# Patient Record
Sex: MALE | Race: WHITE | NOT HISPANIC OR LATINO | URBAN - METROPOLITAN AREA
[De-identification: names, ages, dates, MRNs, and addresses within clinical notes are randomized per-mention and may not be internally consistent; named-entity substitution may affect disease eponyms.]

---

## 2021-08-14 ENCOUNTER — EMERGENCY (EMERGENCY)
Facility: HOSPITAL | Age: 36
LOS: 0 days | Discharge: HOME | End: 2021-08-14
Attending: STUDENT IN AN ORGANIZED HEALTH CARE EDUCATION/TRAINING PROGRAM | Admitting: STUDENT IN AN ORGANIZED HEALTH CARE EDUCATION/TRAINING PROGRAM
Payer: MEDICAID

## 2021-08-14 VITALS
RESPIRATION RATE: 18 BRPM | TEMPERATURE: 98 F | DIASTOLIC BLOOD PRESSURE: 80 MMHG | OXYGEN SATURATION: 99 % | WEIGHT: 195.11 LBS | SYSTOLIC BLOOD PRESSURE: 134 MMHG | HEART RATE: 86 BPM

## 2021-08-14 DIAGNOSIS — Z88.1 ALLERGY STATUS TO OTHER ANTIBIOTIC AGENTS STATUS: ICD-10-CM

## 2021-08-14 DIAGNOSIS — S02.601A FRACTURE OF UNSPECIFIED PART OF BODY OF RIGHT MANDIBLE, INITIAL ENCOUNTER FOR CLOSED FRACTURE: ICD-10-CM

## 2021-08-14 DIAGNOSIS — Z86.59 PERSONAL HISTORY OF OTHER MENTAL AND BEHAVIORAL DISORDERS: ICD-10-CM

## 2021-08-14 DIAGNOSIS — R51.9 HEADACHE, UNSPECIFIED: ICD-10-CM
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
## 2021-08-14 DIAGNOSIS — F17.210 NICOTINE DEPENDENCE, CIGARETTES, UNCOMPLICATED: ICD-10-CM

## 2021-08-14 DIAGNOSIS — R22.0 LOCALIZED SWELLING, MASS AND LUMP, HEAD: ICD-10-CM

## 2021-08-14 PROCEDURE — 99284 EMERGENCY DEPT VISIT MOD MDM: CPT

## 2021-08-14 PROCEDURE — 73620 X-RAY EXAM OF FOOT: CPT | Mod: 26,RT

## 2021-08-14 PROCEDURE — 70486 CT MAXILLOFACIAL W/O DYE: CPT | Mod: 26,MA

## 2021-08-14 RX ORDER — AMOXICILLIN 250 MG/5ML
10 SUSPENSION, RECONSTITUTED, ORAL (ML) ORAL
Qty: 210 | Refills: 0
Start: 2021-08-14 | End: 2021-08-20

## 2021-08-14 RX ORDER — IBUPROFEN 200 MG
600 TABLET ORAL ONCE
Refills: 0 | Status: COMPLETED | OUTPATIENT
Start: 2021-08-14 | End: 2021-08-14

## 2021-08-14 RX ADMIN — Medication 600 MILLIGRAM(S): at 13:24

## 2021-08-14 NOTE — ED PROVIDER NOTE - CARE PROVIDER_API CALL
Alvaro Villanueva; DDS)  Dentistry  [address] [place] [address]  Phone: [phone]  Fax: [phone]  Scheduled Appointment: 08/16/2021 09:00 AM

## 2021-08-14 NOTE — ED PROVIDER NOTE - PHYSICAL EXAMINATION
CONSTITUTIONAL: Well-developed; well-nourished; in no acute distress. PT sitting comfortably in chair  SKIN: warm, dry  HEAD: Normocephalic; atraumatic.  EYES: no conjunctival injection. PERRLA. EOMI.   ENT: No nasal discharge; airway clear, small abrasion in the mouth adjacent to RT molars, no external lacerations or abrasions, symmetric jaw opening/closing, +tenderness/swelling over RT mid-mandible, no TMJ tenderness, no other facial tenderness  NECK: Supple; non tender.  CARD: S1, S2 normal; no murmurs, gallops, or rubs. Regular rate and rhythm.   RESP: No wheezes, rales or rhonchi.  ABD: soft ntnd. BS+  EXT: Normal ROM.  No clubbing, cyanosis. Mild RT midfoot swelling, no medial or lateral malleolus tenderness, no tenderness over the navicula or at the base of 5th metatarsal, PT able to ambulate normally  LYMPH: No acute cervical adenopathy.  NEURO: Alert, oriented, grossly unremarkable.  PSYCH: Cooperative, appropriate.

## 2021-08-14 NOTE — ED ADULT NURSE NOTE - DOES PATIENT HAVE ADVANCE DIRECTIVE
Fax received from [facility]~  Theresa is c/o increased pain in both upper arms.  She can barely lift her arms without crying.  She understands that weather has a lot to do with her pain, however, she is wondering if there is anything else that can be done to alleviate her pain?  She is currently taking Acetaminophen 500 mg one tab po tid at 0800, 1400 and 2000 and Tramadol 50 mg one tab po qid at 0800,1200, 1600 and 2000.   Check labs: CRP and ESR and fax results. Then set up office visit after completed per Dr. Ferreira signed fax.     No

## 2021-08-14 NOTE — ED PROVIDER NOTE - NS ED ROS FT
CONSTITUTIONAL - No acute distress , No weight change  SKIN - No rash  HEMATOLOGIC - No abnormal bleeding or bruising  EYES - , No conjunctival injection, No drainage   ENT -, No sore throat, No neck pain, No rhinorrhea, No ear pain, RT jaw pain/swelling  RESPIRATORY - No shortness of breath, No cough  CARDIAC -No chest pain, No palpitations  GI - No abdominal pain, No nausea, No vomiting, No diarrhea, No constipation, No bright red blood per rectum or melena. No flank pain  - No dysuria, frequency, hematuria.   ENDO - No polydipsia, No polyuria, No heat/cold intolerance  MUSCULOSKELETAL - No joint paint, No swelling, No back pain  NEUROLOGIC - No numbness, No focal weakness, No headache, No dizziness  ALLERGIC- no pruritis

## 2021-08-14 NOTE — ED PROVIDER NOTE - NSFOLLOWUPINSTRUCTIONS_ED_ALL_ED_FT
Mandibular Fracture    A mandibular fracture is a break in the jawbone.    Follow these instructions at home:  If told, apply ice to the injured area:  Put ice in a plastic bag.  Place a towel between your skin and the bag.  Leave the ice on for 20 minutes, 2–3 times per day.  Take over-the-counter and prescription medicines only as told by your doctor.  Follow your doctor's instructions about diet. You may need to eat only soft foods or liquid foods. Make sure to get enough protein and other nutrients in your diet.  Sleep on your back. This makes it so that you do not put pressure on your jaw.  Try not to exercise so hard that you get short of breath.  Contact a doctor if:  You have a bad headache.  You cannot feel parts of your face.  You have very bad pain in your jaw that does not get better when you take medicine.  You feel like you are going to throw up (nauseous) and nothing helps.  You feel anxious and nothing helps.  Your swelling or redness gets worse.  Get help right away if:  You have a fever.  You have trouble breathing.  You feel like your airway is tight.  You cannot swallow your spit (saliva).  You make a high-pitched whistling sound when you breathe (wheeze).  This information is not intended to replace advice given to you by your health care provider. Make sure you discuss any questions you have with your health care provider.
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
## 2021-08-14 NOTE — ED PROVIDER NOTE - PROGRESS NOTE DETAILS
Positive tongue depressor test, will obtain CT max/face to assess for mandibular fracture, also RT foot x-ray to assess for fracture -CD Positive tongue depressor test, will obtain CT max/face to assess for mandibular fracture, also RT foot x-ray to assess for fracture, give ibuprofen for pain -CD Radiology called to confirm mandibular fracture. PT informed, dental contacted (Danielle), dental will come to evaluate the PT. PT remains stable and comfortable -CD RT foot x-ray negative, PT informed - CD OMFS stabilized fracture with wire, antibiotix sent to pharmacy, PT to follow up with OMFS on Monday for additional management -CD

## 2021-08-14 NOTE — ED PROVIDER NOTE - PATIENT PORTAL LINK FT
You can access the FollowMyHealth Patient Portal offered by [facility] by registering at the following website: [email or web address]. By joining [employer]’s FollowMyHealth portal, you will also be able to view your health information using other applications (apps) compatible with our system.
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
## 2021-08-17 PROBLEM — F32.9 MAJOR DEPRESSIVE DISORDER, SINGLE EPISODE, UNSPECIFIED: Chronic | Status: ACTIVE | Noted: 2021-08-14

## 2021-08-17 PROBLEM — F41.9 ANXIETY DISORDER, UNSPECIFIED: Chronic | Status: ACTIVE | Noted: 2021-08-14

## 2021-08-18 ENCOUNTER — OUTPATIENT (OUTPATIENT)
Dept: OUTPATIENT SERVICES | Facility: HOSPITAL | Age: 36
LOS: 1 days | Discharge: HOME | End: 2021-08-18
Payer: MEDICAID

## 2021-08-18 VITALS
RESPIRATION RATE: 18 BRPM | DIASTOLIC BLOOD PRESSURE: 70 MMHG | OXYGEN SATURATION: 99 % | SYSTOLIC BLOOD PRESSURE: 117 MMHG | HEIGHT: 74 IN | WEIGHT: 189.6 LBS | HEART RATE: 61 BPM | TEMPERATURE: 98 F

## 2021-08-18 DIAGNOSIS — Z98.890 OTHER SPECIFIED POSTPROCEDURAL STATES: Chronic | ICD-10-CM

## 2021-08-18 DIAGNOSIS — S02.609B FRACTURE OF MANDIBLE, UNSPECIFIED, INITIAL ENCOUNTER FOR OPEN FRACTURE: ICD-10-CM

## 2021-08-18 DIAGNOSIS — Z01.818 ENCOUNTER FOR OTHER PREPROCEDURAL EXAMINATION: ICD-10-CM

## 2021-08-18 LAB
ALBUMIN SERPL ELPH-MCNC: 4.4 G/DL — SIGNIFICANT CHANGE UP (ref 3.5–5.2)
ALP SERPL-CCNC: 67 U/L — SIGNIFICANT CHANGE UP (ref 30–115)
ALT FLD-CCNC: 13 U/L — SIGNIFICANT CHANGE UP (ref 0–41)
ANION GAP SERPL CALC-SCNC: 8 MMOL/L — SIGNIFICANT CHANGE UP (ref 7–14)
APPEARANCE UR: CLEAR — SIGNIFICANT CHANGE UP
APTT BLD: 35.4 SEC — SIGNIFICANT CHANGE UP (ref 27–39.2)
AST SERPL-CCNC: 19 U/L — SIGNIFICANT CHANGE UP (ref 0–41)
BASOPHILS # BLD AUTO: 0.05 K/UL — SIGNIFICANT CHANGE UP (ref 0–0.2)
BASOPHILS NFR BLD AUTO: 0.6 % — SIGNIFICANT CHANGE UP (ref 0–1)
BILIRUB SERPL-MCNC: <0.2 MG/DL — SIGNIFICANT CHANGE UP (ref 0.2–1.2)
BILIRUB UR-MCNC: NEGATIVE — SIGNIFICANT CHANGE UP
BLD GP AB SCN SERPL QL: SIGNIFICANT CHANGE UP
BUN SERPL-MCNC: 25 MG/DL — HIGH (ref 10–20)
CALCIUM SERPL-MCNC: 9.4 MG/DL — SIGNIFICANT CHANGE UP (ref 8.5–10.1)
CHLORIDE SERPL-SCNC: 101 MMOL/L — SIGNIFICANT CHANGE UP (ref 98–110)
CO2 SERPL-SCNC: 30 MMOL/L — SIGNIFICANT CHANGE UP (ref 17–32)
COLOR SPEC: YELLOW — SIGNIFICANT CHANGE UP
CREAT SERPL-MCNC: 0.9 MG/DL — SIGNIFICANT CHANGE UP (ref 0.7–1.5)
DIFF PNL FLD: NEGATIVE — SIGNIFICANT CHANGE UP
EOSINOPHIL # BLD AUTO: 0.12 K/UL — SIGNIFICANT CHANGE UP (ref 0–0.7)
EOSINOPHIL NFR BLD AUTO: 1.5 % — SIGNIFICANT CHANGE UP (ref 0–8)
GLUCOSE SERPL-MCNC: 79 MG/DL — SIGNIFICANT CHANGE UP (ref 70–99)
GLUCOSE UR QL: NEGATIVE — SIGNIFICANT CHANGE UP
HCT VFR BLD CALC: 31.7 % — LOW (ref 42–52)
HGB BLD-MCNC: 10.3 G/DL — LOW (ref 14–18)
IMM GRANULOCYTES NFR BLD AUTO: 0.3 % — SIGNIFICANT CHANGE UP (ref 0.1–0.3)
INR BLD: 0.99 RATIO — SIGNIFICANT CHANGE UP (ref 0.65–1.3)
KETONES UR-MCNC: NEGATIVE — SIGNIFICANT CHANGE UP
LEUKOCYTE ESTERASE UR-ACNC: NEGATIVE — SIGNIFICANT CHANGE UP
LYMPHOCYTES # BLD AUTO: 2.18 K/UL — SIGNIFICANT CHANGE UP (ref 1.2–3.4)
LYMPHOCYTES # BLD AUTO: 28.1 % — SIGNIFICANT CHANGE UP (ref 20.5–51.1)
MCHC RBC-ENTMCNC: 25.8 PG — LOW (ref 27–31)
MCHC RBC-ENTMCNC: 32.5 G/DL — SIGNIFICANT CHANGE UP (ref 32–37)
MCV RBC AUTO: 79.3 FL — LOW (ref 80–94)
MONOCYTES # BLD AUTO: 0.49 K/UL — SIGNIFICANT CHANGE UP (ref 0.1–0.6)
MONOCYTES NFR BLD AUTO: 6.3 % — SIGNIFICANT CHANGE UP (ref 1.7–9.3)
NEUTROPHILS # BLD AUTO: 4.89 K/UL — SIGNIFICANT CHANGE UP (ref 1.4–6.5)
NEUTROPHILS NFR BLD AUTO: 63.2 % — SIGNIFICANT CHANGE UP (ref 42.2–75.2)
NITRITE UR-MCNC: NEGATIVE — SIGNIFICANT CHANGE UP
NRBC # BLD: 0 /100 WBCS — SIGNIFICANT CHANGE UP (ref 0–0)
PH UR: 6 — SIGNIFICANT CHANGE UP (ref 5–8)
PLATELET # BLD AUTO: 306 K/UL — SIGNIFICANT CHANGE UP (ref 130–400)
POTASSIUM SERPL-MCNC: 4.4 MMOL/L — SIGNIFICANT CHANGE UP (ref 3.5–5)
POTASSIUM SERPL-SCNC: 4.4 MMOL/L — SIGNIFICANT CHANGE UP (ref 3.5–5)
PROT SERPL-MCNC: 6.9 G/DL — SIGNIFICANT CHANGE UP (ref 6–8)
PROT UR-MCNC: NEGATIVE — SIGNIFICANT CHANGE UP
PROTHROM AB SERPL-ACNC: 11.4 SEC — SIGNIFICANT CHANGE UP (ref 9.95–12.87)
RBC # BLD: 4 M/UL — LOW (ref 4.7–6.1)
RBC # FLD: 13.4 % — SIGNIFICANT CHANGE UP (ref 11.5–14.5)
SODIUM SERPL-SCNC: 139 MMOL/L — SIGNIFICANT CHANGE UP (ref 135–146)
SP GR SPEC: 1.03 — HIGH (ref 1.01–1.03)
UROBILINOGEN FLD QL: SIGNIFICANT CHANGE UP
WBC # BLD: 7.75 K/UL — SIGNIFICANT CHANGE UP (ref 4.8–10.8)
WBC # FLD AUTO: 7.75 K/UL — SIGNIFICANT CHANGE UP (ref 4.8–10.8)

## 2021-08-18 PROCEDURE — 71046 X-RAY EXAM CHEST 2 VIEWS: CPT | Mod: 26

## 2021-08-18 PROCEDURE — 93010 ELECTROCARDIOGRAM REPORT: CPT

## 2021-08-18 RX ORDER — SERTRALINE 25 MG/1
200 TABLET, FILM COATED ORAL
Qty: 0 | Refills: 0 | DISCHARGE

## 2021-08-18 RX ORDER — GABAPENTIN 400 MG/1
1 CAPSULE ORAL
Qty: 0 | Refills: 0 | DISCHARGE

## 2021-08-18 RX ORDER — METHADONE HYDROCHLORIDE 40 MG/1
135 TABLET ORAL
Qty: 0 | Refills: 0 | DISCHARGE

## 2021-08-18 NOTE — H&P PST ADULT - REASON FOR ADMISSION
Case Type: OP Block Time Suite: [facility] Proceduralist: Alvaro Villanueva  Confirmed Surgery Date Time: 08-     Procedure: ORIF of the mandible  Laterality: N/A   Length of Procedure: 180 Minutes    Anesthesia Type: General  Covid testing 08/28/2021 1230

## 2021-08-18 NOTE — H&P PST ADULT - NSANTHOSAYNRD_GEN_A_CORE
No. VIN screening performed.  STOP BANG Legend: 0-2 = LOW Risk; 3-4 = INTERMEDIATE Risk; 5-8 = HIGH Risk
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
## 2021-08-18 NOTE — H&P PST ADULT - NSSTREETDRUGTY_GEN_ALL_CORE_SD
Quit 2 yrs ago/amphetamines/cocaine/ecstasy/heroin/marijuana/methamphetamine/narcotics/phencyclidine/sedatives/stimulants

## 2021-08-18 NOTE — H&P PST ADULT - MALLAMPATI CLASS
unable to open mouth unable to open mouth/Class IV (difficult) - the soft palate is not visible at all

## 2021-08-19 LAB
CULTURE RESULTS: NO GROWTH — SIGNIFICANT CHANGE UP
SPECIMEN SOURCE: SIGNIFICANT CHANGE UP

## 2021-08-23 DIAGNOSIS — S02.609D FRACTURE OF MANDIBLE, UNSPECIFIED, SUBSEQUENT ENCOUNTER FOR FRACTURE WITH ROUTINE HEALING: ICD-10-CM

## 2021-08-28 ENCOUNTER — OUTPATIENT (OUTPATIENT)
Dept: OUTPATIENT SERVICES | Facility: HOSPITAL | Age: 36
LOS: 1 days | Discharge: HOME | End: 2021-08-28

## 2021-08-28 DIAGNOSIS — Z11.59 ENCOUNTER FOR SCREENING FOR OTHER VIRAL DISEASES: ICD-10-CM

## 2021-08-28 DIAGNOSIS — Z98.890 OTHER SPECIFIED POSTPROCEDURAL STATES: Chronic | ICD-10-CM

## 2021-08-28 PROBLEM — F19.20 OTHER PSYCHOACTIVE SUBSTANCE DEPENDENCE, UNCOMPLICATED: Chronic | Status: ACTIVE | Noted: 2021-08-18

## 2021-08-31 ENCOUNTER — RESULT REVIEW (OUTPATIENT)
Age: 36
End: 2021-08-31

## 2021-08-31 ENCOUNTER — OUTPATIENT (OUTPATIENT)
Dept: OUTPATIENT SERVICES | Facility: HOSPITAL | Age: 36
LOS: 1 days | Discharge: HOME | End: 2021-08-31
Payer: MEDICAID

## 2021-08-31 VITALS — HEIGHT: 74 IN | TEMPERATURE: 98 F | WEIGHT: 190.04 LBS | OXYGEN SATURATION: 98 %

## 2021-08-31 VITALS
OXYGEN SATURATION: 98 % | HEART RATE: 71 BPM | DIASTOLIC BLOOD PRESSURE: 80 MMHG | TEMPERATURE: 98 F | RESPIRATION RATE: 16 BRPM | SYSTOLIC BLOOD PRESSURE: 132 MMHG

## 2021-08-31 DIAGNOSIS — Z98.890 OTHER SPECIFIED POSTPROCEDURAL STATES: Chronic | ICD-10-CM

## 2021-08-31 PROCEDURE — 88300 SURGICAL PATH GROSS: CPT | Mod: 26

## 2021-08-31 RX ORDER — HYDROMORPHONE HYDROCHLORIDE 2 MG/ML
0.5 INJECTION INTRAMUSCULAR; INTRAVENOUS; SUBCUTANEOUS
Refills: 0 | Status: DISCONTINUED | OUTPATIENT
Start: 2021-08-31 | End: 2021-08-31

## 2021-08-31 RX ORDER — HYDROMORPHONE HYDROCHLORIDE 2 MG/ML
1 INJECTION INTRAMUSCULAR; INTRAVENOUS; SUBCUTANEOUS
Refills: 0 | Status: DISCONTINUED | OUTPATIENT
Start: 2021-08-31 | End: 2021-08-31

## 2021-08-31 RX ADMIN — HYDROMORPHONE HYDROCHLORIDE 0.5 MILLIGRAM(S): 2 INJECTION INTRAMUSCULAR; INTRAVENOUS; SUBCUTANEOUS at 15:45

## 2021-08-31 RX ADMIN — HYDROMORPHONE HYDROCHLORIDE 0.5 MILLIGRAM(S): 2 INJECTION INTRAMUSCULAR; INTRAVENOUS; SUBCUTANEOUS at 16:10

## 2021-08-31 RX ADMIN — HYDROMORPHONE HYDROCHLORIDE 0.5 MILLIGRAM(S): 2 INJECTION INTRAMUSCULAR; INTRAVENOUS; SUBCUTANEOUS at 15:55

## 2021-08-31 RX ADMIN — HYDROMORPHONE HYDROCHLORIDE 0.5 MILLIGRAM(S): 2 INJECTION INTRAMUSCULAR; INTRAVENOUS; SUBCUTANEOUS at 15:30

## 2021-08-31 NOTE — ASU DISCHARGE PLAN (ADULT/PEDIATRIC) - CALL YOUR DOCTOR IF YOU HAVE ANY OF THE FOLLOWING:
Bleeding that does not stop/Swelling that gets worse/Pain not relieved by Medications/Wound/Surgical Site with redness, or foul smelling discharge or pus/Nausea and vomiting that does not stop

## 2021-08-31 NOTE — BRIEF OPERATIVE NOTE - NSICDXBRIEFPROCEDURE_GEN_ALL_CORE_FT
PROCEDURES:  Open reduction and internal fixation (ORIF) of fracture of mandible by maxillomandibular fixation using arch bars and wires 31-Aug-2021 15:20:48  Giorgi Allen  Tooth extraction 31-Aug-2021 15:21:28  Giorgi Allen

## 2021-08-31 NOTE — ASU DISCHARGE PLAN (ADULT/PEDIATRIC) - ASU DC SPECIAL INSTRUCTIONSFT
We uncovered your fractures and placed plates to secure them together. You will have a bar across your lower jaw and screws in the upper jaw in the event we need to wire you if your bite shifts. We have chosen to not wire you closed, but you must follow a strict full liquid diet. Any chewing will cause a shift in the bone and potential failure of the plates. Keep the superficial dressing in place for 24 hours ( you can remove at 3:30 PM tomorrow. The tape along the chin/face must remain in place for 1 week.     Medications:   - Amoxicillin suspension   - Peridex mouth was (begin tomorrow)   - Ibuprofen suspension for pain

## 2021-08-31 NOTE — BRIEF OPERATIVE NOTE - OPERATION/FINDINGS
unfavorable right angle fracture and oblique symphysis fracture. Patient with pre-op class 2 Div 2 malocclusion. Placed patient back into pre-op malocclusion

## 2021-08-31 NOTE — BRIEF OPERATIVE NOTE - NSICDXBRIEFPREOP_GEN_ALL_CORE_FT
Patient is returning call, please see message below.     Callback Number: [phone] (M)  Best Availability: Any time.  Can A Detailed Message Be Left? yes  Did you confirm the message with the caller?: yes    Thank you,  Dwight Kaur       PRE-OP DIAGNOSIS:  Bilateral mandibular fracture 31-Aug-2021 15:21:04  Giorgi Allen

## 2021-08-31 NOTE — CHART NOTE - NSCHARTNOTEFT_GEN_A_CORE
PACU ANESTHESIA ADMISSION NOTE      Procedure: B/L Mandible ORIF  Post op diagnosis:  Mandible Fx    ____  Intubated  TV:______       Rate: ______      FiO2: ______    _x___  Patent Airway    __x__  Full return of protective reflexes    _x___  Full recovery from anesthesia / back to baseline     Vitals:   T:  98         R:  18                BP:  157/89                Sat:   100                P: 89      Mental Status:  __x__ Awake   _____ Alert   _____ Drowsy   _____ Sedated    Nausea/Vomiting:  _x___ NO  ______Yes,   See Post - Op Orders          Pain Scale (0-10):  _____    Treatment: ____ None    __x_ See Post - Op/PCA Orders    Post - Operative Fluids:   ____ Oral   ___x_ See Post - Op Orders    Plan: Discharge:   __x__Home       _____Floor     _____Critical Care    _____  Other:_________________    Comments:

## 2021-08-31 NOTE — ASU DISCHARGE PLAN (ADULT/PEDIATRIC) - CARE PROVIDER_API CALL
Alvaro Villanueva; DDS)  Dentistry  [address] [place] [address]  Phone: [phone]  Fax: [phone]  Established Patient  Scheduled Appointment: 09/10/2021 12:30 PM

## 2021-08-31 NOTE — ASU DISCHARGE PLAN (ADULT/PEDIATRIC) - PROVIDER TOKENS
PROVIDER:[TOKEN:[[id number]],SCHEDULEDAPPT:[09/10/2021],SCHEDULEDAPPTTIME:[12:30 PM],ESTABLISHEDPATIENT:[T]]

## 2021-08-31 NOTE — ASU DISCHARGE PLAN (ADULT/PEDIATRIC) - BATHING
Do not submerge your face, you can wash the rest of your body in the shower and sponge your face/Sponge only

## 2021-09-02 LAB — SURGICAL PATHOLOGY STUDY: SIGNIFICANT CHANGE UP

## 2021-09-06 DIAGNOSIS — S02.66XA FRACTURE OF SYMPHYSIS OF MANDIBLE, INITIAL ENCOUNTER FOR CLOSED FRACTURE: ICD-10-CM

## 2021-09-06 DIAGNOSIS — Z88.1 ALLERGY STATUS TO OTHER ANTIBIOTIC AGENTS STATUS: ICD-10-CM
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
## 2021-09-06 DIAGNOSIS — S02.651A FRACTURE OF ANGLE OF RIGHT MANDIBLE, INITIAL ENCOUNTER FOR CLOSED FRACTURE: ICD-10-CM

## 2022-09-19 ENCOUNTER — APPOINTMENT (OUTPATIENT)
Dept: PLASTIC SURGERY | Facility: CLINIC | Age: 37
End: 2022-09-19

## 2022-09-19 VITALS — HEIGHT: 75 IN | WEIGHT: 215 LBS | BODY MASS INDEX: 26.73 KG/M2

## 2022-09-19 DIAGNOSIS — Z87.891 PERSONAL HISTORY OF NICOTINE DEPENDENCE: ICD-10-CM

## 2022-09-19 PROBLEM — Z00.00 ENCOUNTER FOR PREVENTIVE HEALTH EXAMINATION: Status: ACTIVE | Noted: 2022-09-19

## 2022-09-19 PROCEDURE — 99203 OFFICE O/P NEW LOW 30 MIN: CPT

## 2022-09-19 RX ORDER — METHADONE HYDROCHLORIDE 5 MG/1
TABLET ORAL
Refills: 0 | Status: ACTIVE | COMMUNITY

## 2022-09-21 NOTE — HISTORY OF PRESENT ILLNESS
[FreeTextEntry1] : 37 yo M with PMHx of substance use/IVDU, otherwise healthy who presents by referral from dermatologist Dr. Ahmadi for multiple superficial cysts on the face and scalp. 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 He has noticed these over the past several years, not changing significantly, no h/o infection or discharge. No personal or FHx of skin cancer. \par \par PMH: substance use, in treatment and on methadone\par PSH: ORIF for fractured mandible and L wrist\par Meds: Zoloft, gabaptentin, methadone\par Allergies: NKDA, nausea with clindamycin\par SH: lives by himself, non-smoker, occasional nicotine consumption, 3 years in treatment (h/o heroin and cocaine use), works as a contractor\par

## 2022-09-21 NOTE — PHYSICAL EXAM
[de-identified] : NAD [de-identified] : breathing comfortably [de-identified] : Small <1cm-2cm superficial cysts: R lateral supraorbital margin, R postauricular in the fold, R postauricular closer to hairline, R posterior occipital region, L temporal scalp

## 2022-09-21 NOTE — ASSESSMENT
[FreeTextEntry1] : Pt is a 36M, h/o substance use/IVDU, otherwise healthy who presents by referral from dermatologist Dr. Ahmadi for multiple superficial cysts on the face and scalp, likely epidermal inclusion cysts/pilar cysts. Desires to have them excised. Non-smoker, will withhold nicotine use, no blood thinners. \par \par Recommend excision scalp and facial cysts (5) under local anesthesia\par \par - 5 total lesions, can try to excise all at once\par - will plan for office excision with local anesthesia\par - reviewed risks/benefits/alternatives and patient amenable to proceed\par - reviewed Tylenol ES prior to procedure and post-op RTC\par - all questions were answered.  Informed consent was obtained\par -will schedule at earliest convenience for office procedure\par - photos taken\par \par Due to COVID-19, pre-visit patient instructions were explained to the patient and their symptoms were checked upon arrival. Masks were used by the healthcare provider and staff and the examination room was cleaned after the patient visit concluded\par

## 2022-11-28 ENCOUNTER — APPOINTMENT (OUTPATIENT)
Dept: PLASTIC SURGERY | Facility: CLINIC | Age: 37
End: 2022-11-28

## 2022-11-28 DIAGNOSIS — D48.5 NEOPLASM OF UNCERTAIN BEHAVIOR OF SKIN: ICD-10-CM

## 2022-11-28 PROCEDURE — 13131 CMPLX RPR F/C/C/M/N/AX/G/H/F: CPT

## 2022-11-28 PROCEDURE — 11441 EXC FACE-MM B9+MARG 0.6-1 CM: CPT

## 2022-11-28 PROCEDURE — 11422 EXC H-F-NK-SP B9+MARG 1.1-2: CPT

## 2022-11-28 RX ORDER — CEPHALEXIN 500 MG/1
500 CAPSULE ORAL 4 TIMES DAILY
Qty: 20 | Refills: 0 | Status: ACTIVE | COMMUNITY
Start: 2022-11-28 | End: 1900-01-01

## 2022-11-29 NOTE — ASSESSMENT
[FreeTextEntry1] : Pt is a 36M, h/o substance use/IVDU, otherwise healthy who presents by referral from dermatologist Dr. Ahmadi for multiple superficial cysts on the face and scalp, likely epidermal inclusion cysts/pilar cysts. Desires to have them excised. Non-smoker, will withhold nicotine use, no blood thinners. \par \par s/p scalp and facial cysts (5) under local anesthesia\par \par - pt tolerated the procedure\par - abx rx sent pharmacy\par - \par - c/w Tylenol RTC for pain control\par - may return to work on Wed\par - physical activity limitations reviewed x 2 days\par - bacitracin BID to scalp incisions\par - keep right perioribital incision clean and dry\par - all questions were answered.  \par - follow up in 1 week for right periorbital suture tail removal, path review, scar mgmt\par \par - photos taken at last visit\par \par Due to COVID-19, pre-visit patient instructions were explained to the patient and their symptoms were checked upon arrival. Masks were used by the healthcare provider and staff and the examination room was cleaned after the patient visit concluded\par

## 2022-11-29 NOTE — HISTORY OF PRESENT ILLNESS
[FreeTextEntry1] : 35 yo M with PMHx of substance use/IVDU, otherwise healthy who presents by referral from dermatologist Dr. Ahmadi for multiple superficial cysts on the face and scalp. 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 He has noticed these over the past several years, not changing significantly, no h/o infection or discharge. No personal or FHx of skin cancer. 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  No new health issues.

## 2022-11-29 NOTE — PROCEDURE
[FreeTextEntry1] : scalp and facial cysts (total 5) [FreeTextEntry2] : scalp and facial cysts (total 5) excision under local anesthesia [FreeTextEntry6] : Benefits, risks and alternatives of the procedure were discussed. The risks include but not limited to bleeding, infection, poor wound healing and scarring, possible keloid, and need for re-operation. Location of scar and expected post-surgical outcomes were discussed. The patient understands the risks and would like to proceed with the office surgery.\par \par The skin lesion was marked and infiltrated with local anesthesia to effect.  The excision site was prepared and draped in sterile fashion.\par The skin lesion was excised with the indicated margins in the usual fashion and sent to pathology for review. \par Informed consent was obtained\par Surgical wounds were made hemostatic and repaired as follows:\par \par Site: R lateral supraorbital margin\par Skin lesion width (with margins): 1.1 cm\par Closure complexity and length: 5-0 monocryl running subcuticular 1.1 cm\par \par Site: R postauricular in the fold,\par Skin lesion width (with margins): 2 cm\par Closure complexity and length: 2 cm, simple running 3-0 chromic\par \par Site:  R postauricular scalp closer to hairline\par Skin lesion width (with margins): 2 cm\par Closure complexity and length:2 cm, simple running 3-0 chromic\par \par Site: R posterior occipital region\par Skin lesion width (with margins): 1.5 cm\par Closure complexity and length:  1.5 cm, simple 3-0 chromic\par \par Site: L temporal scalp\par Skin lesion width (with margins): 1.2 cm\par Closure complexity and length: 1.2 cm, simple running 3-0 chromic [FreeTextEntry7] : 5 facial and scalp cysts

## 2022-11-29 NOTE — PHYSICAL EXAM
[de-identified] : NAD [de-identified] : breathing comfortably [de-identified] : Small <1cm-2cm superficial cysts: R lateral supraorbital margin, R postauricular in the fold recently ruptured with open cyst cavity;, R postauricular closer to hairline, R posterior occipital region, L temporal scalp

## 2022-12-01 LAB — CORE LAB BIOPSY: NORMAL

## 2022-12-05 ENCOUNTER — APPOINTMENT (OUTPATIENT)
Dept: PLASTIC SURGERY | Facility: CLINIC | Age: 37
End: 2022-12-05

## 2022-12-05 DIAGNOSIS — L72.0 EPIDERMAL CYST: ICD-10-CM

## 2022-12-05 PROCEDURE — 99024 POSTOP FOLLOW-UP VISIT: CPT

## 2022-12-05 NOTE — HISTORY OF PRESENT ILLNESS
[FreeTextEntry1] : 37 yo M with PMHx of substance use/IVDU, otherwise healthy who presents by referral from dermatologist Dr. Ahmadi for multiple superficial cysts on the face and scalp. 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 He has noticed these over the past several years, not changing significantly, no h/o infection or discharge. No personal or FHx of skin cancer. 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  No new health issues.\par \par Interval hx (12/5/22): Pt is 1 week s/p excision of 5x scalp & facial cysts. Here for suture removal of R periorbital incision. Pt denies fever/chills or drainage from any sites. Abx course completed. Denies significant pain/tenderness

## 2022-12-05 NOTE — DATA REVIEWED
[FreeTextEntry1] : Tissue Biopsy             Final\par \par No Documents Attached\par \par \par Patient:   TAD MORTON\par \par \par Accession:                             [id number]\par \par Collected Date/Time:                   11/28/2022 14:44 EST\par Received Date/Time:                    11/29/2022 09:56 EST\par \par Surgical Pathology Report - Auth (Verified)\par \par Specimen(s) Submitted\par 1  Right postauricular cyst\par 2  Inclusion cysts of face and scalp (4)\par \par Final Diagnosis\par 1.  Right postauricular cyst, excision:\par -  Dilated pore of [place].\par \par \par 2.  Inclusion cysts of face and scalp (4), excision:\par -  Epidermal inclusion cysts (4), the largest showing evidence of rupture\par  with chronic inflammation and foreign body type giant cell reaction.\par Verified by: Cuca Norman M.D.\par (Electronic Signature)\par Reported on: 12/01/22 15:29 EST, [facility],\par  [address], [place] [address]\par Phone: [phone]   Fax: [phone]\par _________________________________________________________________\par \par \par Perioperative Diagnosis\par D48.5-Neoplasm of uncertain behavior of skin\par \par Gross Description\par 1.  Specimen received in formalin labeled "right postauricular cyst",\par  consists of single irregular gray soft tissue fragment, 1.4 x 1.1 x 0.5\par  cm, with attached tan unremarkable skin, 1.1 x 0.5 cm. There is single\par  0.5 cm opening on the skin surface. The margin is inked black. Sectioning\par  reveals a cavity communicating with the opening, with gray to white and\par  smooth lining. Specimen is entirely submitted. (1 block)\par \par 2.  Specimen received in formalin labeled "inclusion cysts of face and\par  scalp", consists of three white pearly white soft to firm nodules,\par  1.5 x 1.2 x 0.5 cm in aggregate, and a yellow to gray soft previously\par  opened collapsed cyst measuring 1.6 x 1.3 x 0.2 cm. Outer surfaces of\par  cyst and the nodules are inked black. Sectioning of cyst reveals white\par  thick material. Cut surfaces of the nodule are white to yellow and firm.\par  Representative sections are submitted. (1 block)\par \par Specimen was received and underwent gross examination at [facility], [address] [place] [address].\par \par 11/29/2022 12:55:00 EST NK\par \par \par  Ordered by: FRANCHESKA CURRIE       Collected/Examined: 28Nov2022 02:44PM       \par Verification Required       Stage: Final       \par  Performed at: [facility] (Med Director: Sergo Forrester)       Resulted: [id number] 03:29PM       Last Updated: [id number] 03:29PM       Accession: [id number]

## 2022-12-05 NOTE — PHYSICAL EXAM
[de-identified] : NAD [de-identified] : breathing comfortably [de-identified] : Multiple small (<1.5cm) incision sites ( R lateral supraorbital margin, R postauricular, R postauricular closer to hairline, R posterior occipital region, L temporal scalp). All sites are CDI with no signs of infection/active drainage. Skin well approximated

## 2022-12-05 NOTE — ASSESSMENT
[FreeTextEntry1] : Pt is a 36M, h/o substance use/IVDU, otherwise healthy who presents by referral from dermatologist Dr. Ahmadi for multiple superficial cysts on the face and scalp, likely epidermal inclusion cysts/pilar cysts. Desires to have them excised. Non-smoker, will withhold nicotine use, no blood thinners. \par \par Pt is 1 week s/p excision of 5x scalp & facial cysts. Doing very well\par \par - path in chart & reviewed with pt (Inclusion cysts & Dilated pore of [place] R post-auricular)\par - D/c R periorbital suture tail\par - Aquaphor to all sites BID\par - Continue smoking cessation\par - Light activity ok\par - follow up 4-6 weeks for final wound check\par \par \par Due to COVID-19, pre-visit patient instructions were explained to the patient and their symptoms were checked upon arrival. Masks were used by the healthcare provider and staff and the examination room was cleaned after the patient visit concluded\par

## 2023-01-09 ENCOUNTER — APPOINTMENT (OUTPATIENT)
Dept: PLASTIC SURGERY | Facility: CLINIC | Age: 38
End: 2023-01-09